# Patient Record
Sex: FEMALE | Race: OTHER | Employment: OTHER | URBAN - METROPOLITAN AREA
[De-identification: names, ages, dates, MRNs, and addresses within clinical notes are randomized per-mention and may not be internally consistent; named-entity substitution may affect disease eponyms.]

---

## 2018-11-06 ENCOUNTER — NEW PATIENT COMPREHENSIVE (OUTPATIENT)
Dept: URBAN - METROPOLITAN AREA CLINIC 47 | Facility: CLINIC | Age: 48
End: 2018-11-06

## 2018-11-06 DIAGNOSIS — H52.03: ICD-10-CM

## 2018-11-06 PROCEDURE — 92015 DETERMINE REFRACTIVE STATE: CPT

## 2018-11-06 PROCEDURE — 92004 COMPRE OPH EXAM NEW PT 1/>: CPT

## 2018-11-06 ASSESSMENT — TONOMETRY
OD_IOP_MMHG: 14
OS_IOP_MMHG: 14

## 2018-11-06 ASSESSMENT — VISUAL ACUITY
OD_SC: J3
OS_SC: 20/25
OS_SC: J5
OD_SC: 20/25

## 2021-06-10 ENCOUNTER — EST. PATIENT EMERGENCY (OUTPATIENT)
Dept: URBAN - METROPOLITAN AREA CLINIC 43 | Facility: CLINIC | Age: 51
End: 2021-06-10

## 2021-06-10 DIAGNOSIS — H15.101: ICD-10-CM

## 2021-06-10 DIAGNOSIS — H10.33: ICD-10-CM

## 2021-06-10 DIAGNOSIS — H52.03: ICD-10-CM

## 2021-06-10 PROCEDURE — 92012 INTRM OPH EXAM EST PATIENT: CPT

## 2021-06-10 RX ORDER — NEOMYCIN SULFATE, POLYMYXIN B SULFATE AND DEXAMETHASONE 3.5; 10000; 1 MG/ML; [USP'U]/ML; MG/ML: 1 SUSPENSION OPHTHALMIC

## 2021-06-10 ASSESSMENT — VISUAL ACUITY
OS_SC: 20/20-2
OD_SC: 20/30+2

## 2021-06-30 ENCOUNTER — FOLLOW UP (OUTPATIENT)
Dept: URBAN - METROPOLITAN AREA CLINIC 43 | Facility: CLINIC | Age: 51
End: 2021-06-30

## 2021-06-30 DIAGNOSIS — H15.101: ICD-10-CM

## 2021-06-30 DIAGNOSIS — H10.33: ICD-10-CM

## 2021-06-30 PROCEDURE — 92012 INTRM OPH EXAM EST PATIENT: CPT

## 2021-06-30 ASSESSMENT — VISUAL ACUITY
OS_SC: 20/20
OD_SC: 20/20-3

## 2021-07-30 ENCOUNTER — ESTABLISHED COMPREHENSIVE EXAM (OUTPATIENT)
Dept: URBAN - METROPOLITAN AREA CLINIC 43 | Facility: CLINIC | Age: 51
End: 2021-07-30

## 2021-07-30 DIAGNOSIS — H52.203: ICD-10-CM

## 2021-07-30 DIAGNOSIS — H52.03: ICD-10-CM

## 2021-07-30 DIAGNOSIS — H52.4: ICD-10-CM

## 2021-07-30 PROCEDURE — 92014 COMPRE OPH EXAM EST PT 1/>: CPT

## 2021-07-30 PROCEDURE — 92015 DETERMINE REFRACTIVE STATE: CPT

## 2021-07-30 ASSESSMENT — VISUAL ACUITY
OD_CC: J2
OD_SC: 20/40-2
OS_SC: J8-
OS_SC: 20/25-2
OS_CC: J2
OD_SC: J10

## 2021-07-30 ASSESSMENT — TONOMETRY
OS_IOP_MMHG: 14
OD_IOP_MMHG: 14

## 2022-01-04 NOTE — PATIENT DISCUSSION
Patient advised of the right to post-operative care by the surgeon. Patient is fully informed of, and agreed to, co-management with their primary optometric physician. Post-operative care by the surgeon is not medically necessary and co-management is clinically appropriate. Patient has received itemization of fees related to cataract surgery. Transfer of care letter completed for the patient. Transfer care of right eye to Dr. Zoila Webster on 1/4/2022. Patient instructed to call immediately if any new distortion, blurring, decreased vision or eye pain.

## 2022-02-14 NOTE — PROCEDURE NOTE: CLINICAL
PROCEDURE NOTE: Removal of Corneal FB at Slit Lamp OD. Diagnosis: Attention to Surgical Dressings and Sutures. Anesthesia: Topical. After the risks, benefits, and alternatives to the procedure were explained to the patient, informed consent was obtained. Corneal foreign body was removed using a 25 gauge needle at the slit lamp. Patient tolerated procedure well. There were no complications. Post-op instructions given. Elaine Villa

## 2022-07-11 NOTE — PATIENT DISCUSSION
Patient C/O spot in [place]. Likely due to opacities. Scleral depressed 360 today. NO RT/RD. Monitor.

## 2022-07-11 NOTE — PATIENT DISCUSSION
slightly increased  VMT, minimal progression, VA stable, Patient defers PPV at this time, will monitor tere.